# Patient Record
Sex: MALE | Race: WHITE | NOT HISPANIC OR LATINO | URBAN - NONMETROPOLITAN AREA
[De-identification: names, ages, dates, MRNs, and addresses within clinical notes are randomized per-mention and may not be internally consistent; named-entity substitution may affect disease eponyms.]

---

## 2018-12-19 DIAGNOSIS — M25.561 PAIN IN BOTH KNEES, UNSPECIFIED CHRONICITY: Primary | ICD-10-CM

## 2018-12-19 DIAGNOSIS — M25.562 PAIN IN BOTH KNEES, UNSPECIFIED CHRONICITY: Primary | ICD-10-CM

## 2018-12-21 ENCOUNTER — HOSPITAL ENCOUNTER (OUTPATIENT)
Dept: GENERAL RADIOLOGY | Facility: HOSPITAL | Age: 63
Discharge: HOME | End: 2018-12-21
Attending: ORTHOPAEDIC SURGERY | Admitting: ORTHOPAEDIC SURGERY

## 2018-12-21 ENCOUNTER — OFFICE VISIT (OUTPATIENT)
Dept: ORTHOPEDIC SURGERY | Facility: CLINIC | Age: 63
End: 2018-12-21

## 2018-12-21 VITALS
WEIGHT: 245 LBS | BODY MASS INDEX: 33.18 KG/M2 | HEIGHT: 72 IN | SYSTOLIC BLOOD PRESSURE: 121 MMHG | DIASTOLIC BLOOD PRESSURE: 72 MMHG | HEART RATE: 66 BPM

## 2018-12-21 DIAGNOSIS — M23.007 PERIMENISCAL CYST OF LEFT KNEE: ICD-10-CM

## 2018-12-21 DIAGNOSIS — M17.11 PRIMARY OSTEOARTHRITIS OF RIGHT KNEE: ICD-10-CM

## 2018-12-21 DIAGNOSIS — M17.12 PRIMARY OSTEOARTHRITIS OF LEFT KNEE: ICD-10-CM

## 2018-12-21 DIAGNOSIS — G89.29 BILATERAL CHRONIC KNEE PAIN: Primary | ICD-10-CM

## 2018-12-21 DIAGNOSIS — M25.562 BILATERAL CHRONIC KNEE PAIN: Primary | ICD-10-CM

## 2018-12-21 DIAGNOSIS — M25.561 BILATERAL CHRONIC KNEE PAIN: Primary | ICD-10-CM

## 2018-12-21 DIAGNOSIS — M25.562 PAIN IN BOTH KNEES, UNSPECIFIED CHRONICITY: ICD-10-CM

## 2018-12-21 DIAGNOSIS — M25.561 PAIN IN BOTH KNEES, UNSPECIFIED CHRONICITY: ICD-10-CM

## 2018-12-21 PROCEDURE — 73562 X-RAY EXAM OF KNEE 3: CPT | Performed by: RADIOLOGY

## 2018-12-21 PROCEDURE — 20610 DRAIN/INJ JOINT/BURSA W/O US: CPT | Performed by: ORTHOPAEDIC SURGERY

## 2018-12-21 PROCEDURE — 73560 X-RAY EXAM OF KNEE 1 OR 2: CPT | Performed by: RADIOLOGY

## 2018-12-21 PROCEDURE — 73562 X-RAY EXAM OF KNEE 3: CPT

## 2018-12-21 PROCEDURE — 99203 OFFICE O/P NEW LOW 30 MIN: CPT | Performed by: ORTHOPAEDIC SURGERY

## 2018-12-21 PROCEDURE — 73560 X-RAY EXAM OF KNEE 1 OR 2: CPT

## 2018-12-21 RX ORDER — ATENOLOL 50 MG/1
50 TABLET ORAL DAILY
Refills: 4 | COMMUNITY
Start: 2018-10-29

## 2018-12-21 RX ORDER — IBUPROFEN 800 MG/1
TABLET ORAL
Refills: 4 | COMMUNITY
Start: 2018-12-19

## 2018-12-21 RX ORDER — ACETAMINOPHEN 160 MG
2000 TABLET,DISINTEGRATING ORAL DAILY
Refills: 2 | COMMUNITY
Start: 2018-12-13

## 2018-12-21 RX ORDER — ROSUVASTATIN CALCIUM 5 MG/1
5 TABLET, COATED ORAL DAILY
Refills: 1 | COMMUNITY
Start: 2018-11-13

## 2018-12-21 RX ADMIN — METHYLPREDNISOLONE ACETATE 40 MG: 40 INJECTION, SUSPENSION INTRA-ARTICULAR; INTRALESIONAL; INTRAMUSCULAR; SOFT TISSUE at 11:05

## 2018-12-21 RX ADMIN — METHYLPREDNISOLONE ACETATE 40 MG: 40 INJECTION, SUSPENSION INTRA-ARTICULAR; INTRALESIONAL; INTRAMUSCULAR; SOFT TISSUE at 11:00

## 2018-12-21 RX ADMIN — LIDOCAINE HYDROCHLORIDE 2 ML: 20 INJECTION, SOLUTION INFILTRATION; PERINEURAL at 11:00

## 2018-12-21 RX ADMIN — LIDOCAINE HYDROCHLORIDE 2 ML: 20 INJECTION, SOLUTION INFILTRATION; PERINEURAL at 11:05

## 2018-12-21 NOTE — PROGRESS NOTES
New Patient Visit      Patient: Antoni Mckeon  YOB: 1955  Date of Encounter: 12/21/2018        Chief Complaint:   Chief Complaint   Patient presents with   • Right Knee - Pain   • Left Knee - Pain       HPI:   Antoni Mckeon, 63 y.o. male, referred by Melba Munoz APRN presents for evaluation of bilateral knee pain left worse than the right.  He's had treatment in [place] for both knees and around 2000 underwent arthroscopy to both knees and had a follow-up procedure he is not certain what was done.  He complains of both knees painful with activity has difficulty walking even short distances.  He's noticed a large prominence develop on the left knee.  He's had no recent trauma.  He denies giving way or locking of either knee.    Active Problem List:  Patient Active Problem List   Diagnosis   • Bilateral chronic knee pain       Past Medical History:  Past Medical History:   Diagnosis Date   • Diabetes (CMS/HCC)    • HLD (hyperlipidemia)    • HTN (hypertension)    • Mini stroke (CMS/HCC)    • Sleep apnea        Past Surgical History:  Past Surgical History:   Procedure Laterality Date   • HERNIA REPAIR     • KNEE SURGERY         Family History:  No family history on file.    Social History:  Social History     Socioeconomic History   • Marital status:      Spouse name: Not on file   • Number of children: Not on file   • Years of education: Not on file   • Highest education level: Not on file   Social Needs   • Financial resource strain: Not on file   • Food insecurity - worry: Not on file   • Food insecurity - inability: Not on file   • Transportation needs - medical: Not on file   • Transportation needs - non-medical: Not on file   Occupational History   • Not on file   Tobacco Use   • Smoking status: Current Every Day Smoker     Packs/day: 0.50   • Smokeless tobacco: Never Used   Substance and Sexual Activity   • Alcohol use: No     Frequency: Never   • Drug use: No   • Sexual activity:  "Not on file   Other Topics Concern   • Not on file   Social History Narrative   • Not on file     Body mass index is 33.23 kg/m².    Medications:  Current Outpatient Medications   Medication Sig Dispense Refill   • atenolol (TENORMIN) 50 MG tablet Take 50 mg by mouth Daily.  4   • Cholecalciferol (VITAMIN D3) 2000 units capsule Take 2,000 Units by mouth Daily.  2   • ibuprofen (ADVIL,MOTRIN) 800 MG tablet TAKE ONE TABLET BY MOUTH TWO TIMES A DAY AS NEEDED  4   • metFORMIN (GLUCOPHAGE) 1000 MG tablet TAKE ONE TABLET BY MOUTH EVERY MORNING   1 TABLET EVERY EVENING WITH MEALS  4   • rosuvastatin (CRESTOR) 5 MG tablet Take 5 mg by mouth Daily.  1     No current facility-administered medications for this visit.        Allergies:  No Known Allergies    Review of Systems:   Review of Systems   Constitutional: Positive for activity change.   HENT: Positive for hearing loss.    Eyes: Negative.    Respiratory: Negative.    Cardiovascular: Negative.    Gastrointestinal: Negative.    Endocrine: Negative.    Genitourinary: Negative.    Musculoskeletal: Positive for arthralgias, back pain, gait problem and joint swelling.   Skin: Negative.    Allergic/Immunologic: Negative.    Neurological: Negative for numbness.   Hematological: Negative.    Psychiatric/Behavioral: Negative.        Physical Exam:   Physical Exam  GENERAL: 63 y.o. male, alert and oriented X 3 in no acute distress.   Visit Vitals  /72   Pulse 66   Ht 182.9 cm (72\")   Wt 111 kg (245 lb)   BMI 33.23 kg/m²     Musculoskeletal:   Left knee evaluation reveals flexion contracture about 15° with a large cystic mass over the anterolateral aspect.  He has marked medial and lateral joint line tenderness he flexes to 90° with no gross instability significant patellofemoral crepitance.      Right knee evaluation shows flexion to 100° extension lacks 8° he has no instability with varus valgus stressing Lachman or drawer marked medial joint line " tenderness.    Radiology/Labs:     Radiographs of left knee obtained today weightbearing show complete loss of joint space medial compartment with osteophyte formation medially laterally and within the patellofemoral joint.      Radiographs of right knee 75% loss of joint space medial compartment with irregularity medial femoral condyle and osteophyte formation.    Assessment & Plan:   63 y.o. male with advanced osteoarthritis bilateral knees cystic mass left knee.  His best option would be total knee arthroplasty on the left and eventually on the right.  The cyst could likely be treated at the time of his left knee replacement.  Today he has a temporary measure he was treated with aspiration steroid injection removing 5 cc from the left knee and injecting Depo-Medrol, 17 cc was aspirated from the right knee and injecting Depo-Medrol.  He wishes to consider left total knee replacement he'll follow-up in January and if he wishes to schedule we will proceed.      ICD-10-CM ICD-9-CM   1. Bilateral chronic knee pain M25.561 719.46    M25.562 338.29    G89.29    2. Primary osteoarthritis of left knee M17.12 715.16   3. Primary osteoarthritis of right knee M17.11 715.16   4. Perimeniscal cyst of left knee M23.007 717.5     Large Joint Arthrocentesis: R knee  Date/Time: 12/21/2018 11:00 AM  Consent given by: patient  Site marked: site marked  Supporting Documentation  Indications: pain and joint swelling   Procedure Details  Location: knee - R knee  Preparation: Patient was prepped and draped in the usual sterile fashion  Needle size: 18 G  Approach: anterolateral  Medications administered: 40 mg methylPREDNISolone acetate 40 MG/ML; 2 mL lidocaine 2%  Aspirate amount: 17 mL  Aspirate: serous  Patient tolerance: patient tolerated the procedure well with no immediate complications    Large Joint Arthrocentesis: L knee  Date/Time: 12/21/2018 11:05 AM  Consent given by: patient  Site marked: site marked  Supporting  Documentation  Indications: pain and joint swelling   Procedure Details  Location: knee - L knee  Preparation: Patient was prepped and draped in the usual sterile fashion  Needle size: 18 G  Approach: anterolateral  Medications administered: 40 mg methylPREDNISolone acetate 40 MG/ML; 2 mL lidocaine 2%  Aspirate amount: 5 mL  Aspirate: serous  Patient tolerance: patient tolerated the procedure well with no immediate complications                Cc:   Melba Munoz APRN          Scribed for Antoni Canas MD by Glenis Canas RN.9:55 AM 12/21/2018

## 2018-12-22 PROBLEM — M25.561 BILATERAL CHRONIC KNEE PAIN: Status: ACTIVE | Noted: 2018-12-22

## 2018-12-22 PROBLEM — M25.562 BILATERAL CHRONIC KNEE PAIN: Status: ACTIVE | Noted: 2018-12-22

## 2018-12-22 PROBLEM — G89.29 BILATERAL CHRONIC KNEE PAIN: Status: ACTIVE | Noted: 2018-12-22

## 2018-12-22 RX ORDER — LIDOCAINE HYDROCHLORIDE 20 MG/ML
2 INJECTION, SOLUTION INFILTRATION; PERINEURAL
Status: COMPLETED | OUTPATIENT
Start: 2018-12-21 | End: 2018-12-21

## 2018-12-22 RX ORDER — METHYLPREDNISOLONE ACETATE 40 MG/ML
40 INJECTION, SUSPENSION INTRA-ARTICULAR; INTRALESIONAL; INTRAMUSCULAR; SOFT TISSUE
Status: COMPLETED | OUTPATIENT
Start: 2018-12-21 | End: 2018-12-21

## 2019-02-11 ENCOUNTER — OFFICE VISIT (OUTPATIENT)
Dept: ORTHOPEDIC SURGERY | Facility: CLINIC | Age: 64
End: 2019-02-11

## 2019-02-11 VITALS
WEIGHT: 250 LBS | HEIGHT: 72 IN | HEART RATE: 65 BPM | BODY MASS INDEX: 33.86 KG/M2 | SYSTOLIC BLOOD PRESSURE: 131 MMHG | DIASTOLIC BLOOD PRESSURE: 77 MMHG

## 2019-02-11 DIAGNOSIS — G89.29 CHRONIC PAIN OF LEFT KNEE: ICD-10-CM

## 2019-02-11 DIAGNOSIS — M25.562 CHRONIC PAIN OF LEFT KNEE: ICD-10-CM

## 2019-02-11 DIAGNOSIS — M17.12 PRIMARY OSTEOARTHRITIS OF LEFT KNEE: Primary | ICD-10-CM

## 2019-02-11 PROCEDURE — 99214 OFFICE O/P EST MOD 30 MIN: CPT | Performed by: ORTHOPAEDIC SURGERY

## 2019-02-11 PROCEDURE — 99406 BEHAV CHNG SMOKING 3-10 MIN: CPT | Performed by: ORTHOPAEDIC SURGERY

## 2019-02-11 NOTE — PROGRESS NOTES
History and Physical      Patient: Antoni Mckeon  YOB: 1955  Date of Encounter: 02/11/2019      Chief Complaint:   Chief Complaint   Patient presents with   • Left Knee - Pain       HPI:   Antoni Mckeon, 63 y.o. male, seen today in follow-up initially referred by Irvin Munoz MD. He is known to have advanced degenerative arthritis bilateral knees.  He underwent arthroscopy to both knees while living in [place].  He continues to experience significant pain at rest, worsening pain with activity such as walking.  He is limited due to very short distances.  He received steroid injection last visit on 12/21/2018 had several weeks of good response and then pain has slowly returned.  His pain is now back to the level in December when he received steroid injections.  His left knee is currently is his worse knee.  He wishes to consider left total knee replacement.    Active Problem List:  Patient Active Problem List   Diagnosis   • Bilateral chronic knee pain   • Primary osteoarthritis of left knee   • Primary osteoarthritis of right knee   • HLD (hyperlipidemia)   • HTN (hypertension)   • Sleep apnea   • Diabetes (CMS/HCC)       Past Medical History:  Past Medical History:   Diagnosis Date   • Diabetes (CMS/HCC)    • HLD (hyperlipidemia)    • HTN (hypertension)    • Mini stroke (CMS/HCC)    • Sleep apnea        Past Surgical History:  Past Surgical History:   Procedure Laterality Date   • HERNIA REPAIR     • KNEE SURGERY         Family History:  Family History   Problem Relation Age of Onset   • No Known Problems Mother    • No Known Problems Father        Social History:  Social History     Socioeconomic History   • Marital status:      Spouse name: Not on file   • Number of children: Not on file   • Years of education: Not on file   • Highest education level: Not on file   Social Needs   • Financial resource strain: Not on file   • Food insecurity - worry: Not on file   • Food insecurity  - inability: Not on file   • Transportation needs - medical: Not on file   • Transportation needs - non-medical: Not on file   Occupational History   • Not on file   Tobacco Use   • Smoking status: Current Every Day Smoker     Packs/day: 0.50   • Smokeless tobacco: Never Used   Substance and Sexual Activity   • Alcohol use: No     Frequency: Never   • Drug use: No   • Sexual activity: Defer   Other Topics Concern   • Not on file   Social History Narrative   • Not on file     Patient's Body mass index is 33.91 kg/m². BMI is above normal parameters. Recommendations include: educational material.  I advised Antoni of the risks of continuing to use tobacco, and I provided him with tobacco cessation educational materials in the After Visit Summary.     During this visit, I spent 3 minutes counseling the patient regarding tobacco cessation.      Medications:  Current Outpatient Medications   Medication Sig Dispense Refill   • atenolol (TENORMIN) 50 MG tablet Take 50 mg by mouth Daily.  4   • Cholecalciferol (VITAMIN D3) 2000 units capsule Take 2,000 Units by mouth Daily.  2   • ibuprofen (ADVIL,MOTRIN) 800 MG tablet TAKE ONE TABLET BY MOUTH TWO TIMES A DAY AS NEEDED  4   • metFORMIN (GLUCOPHAGE) 1000 MG tablet TAKE ONE TABLET BY MOUTH EVERY MORNING   1 TABLET EVERY EVENING WITH MEALS  4   • rosuvastatin (CRESTOR) 5 MG tablet Take 5 mg by mouth Daily.  1     No current facility-administered medications for this visit.        Allergies:  No Known Allergies    Review of Systems:   Review of Systems   Constitutional: Positive for activity change.   HENT: Positive for hearing loss.    Eyes: Negative.    Respiratory: Negative.    Cardiovascular: Negative.    Gastrointestinal: Negative.    Endocrine: Negative.    Genitourinary: Negative.    Musculoskeletal: Positive for arthralgias, back pain, gait problem and joint swelling.   Skin: Negative.    Allergic/Immunologic: Negative.    Neurological: Negative for numbness.  "  Hematological: Negative.    Psychiatric/Behavioral: The patient is nervous/anxious.        Physical Exam:   Physical Exam   Constitutional: He is oriented to person, place, and time. He appears well-developed. No distress.   HENT:   Head: Normocephalic and atraumatic.   Nose: Nose normal.   Mouth/Throat: Oropharynx is clear and moist.   Eyes: Conjunctivae and EOM are normal. Right eye exhibits no discharge. Left eye exhibits no discharge.   Neck: Normal range of motion. Neck supple.   Cardiovascular: Normal rate, regular rhythm, normal heart sounds and intact distal pulses.   Pulmonary/Chest: Effort normal. No respiratory distress. He has wheezes.   Abdominal: Soft. He exhibits no distension. There is no guarding.   Musculoskeletal: He exhibits edema (Left knee joint effusion).   Left knee evaluation reveals 7° flexion contracture, large fluid-filled mass anterolateral aspect of his knee.  He flexes further to 110°, has moderate varus alignment, no gross deformity, neurovascular grossly intact.  He has very large effusion.   Neurological: He is alert and oriented to person, place, and time.   Skin: Skin is warm and dry. Capillary refill takes less than 2 seconds. He is not diaphoretic.   Psychiatric: He has a normal mood and affect. His behavior is normal. Judgment and thought content normal.   Nursing note and vitals reviewed.    GENERAL: 63 y.o. male, alert and oriented X 3 in no acute distress.   Visit Vitals  /77   Pulse 65   Ht 182.9 cm (72\")   Wt 113 kg (250 lb)   BMI 33.91 kg/m²     Radiology/Labs:    Advanced osteoarthritis left knee with moderate varus alignment complete loss of joint space medial compartment.    Assessment & Plan:   63 y.o. male advanced osteoarthritis bilateral knees his left is currently his worst.  He wishes to schedule left total knee replacement.Today we had long discussion regarding the proposed procedure, risks and benefits. He verbalized understanding.  I think he is a good " candidate. He will need medical clearance in advance along with dental clearance.  We will request both and once obtained he  will be schedule for left total knee replacement at [facility].       ICD-10-CM ICD-9-CM   1. Primary osteoarthritis of left knee M17.12 715.16   2. Chronic pain of left knee M25.562 719.46    G89.29 338.29           Cc:   Irvin Munoz MD          Scribed for Antoni Canas MD by Glenis Canas RN.10:41 AM 02/11/2019

## 2019-02-12 PROBLEM — E78.5 HLD (HYPERLIPIDEMIA): Status: ACTIVE | Noted: 2019-02-12

## 2019-02-12 PROBLEM — I10 HTN (HYPERTENSION): Status: ACTIVE | Noted: 2019-02-12

## 2019-02-12 PROBLEM — M17.12 PRIMARY OSTEOARTHRITIS OF LEFT KNEE: Status: ACTIVE | Noted: 2019-02-12

## 2019-02-12 PROBLEM — G47.30 SLEEP APNEA: Status: ACTIVE | Noted: 2019-02-12

## 2019-02-12 PROBLEM — M17.11 PRIMARY OSTEOARTHRITIS OF RIGHT KNEE: Status: ACTIVE | Noted: 2019-02-12

## 2019-02-12 PROBLEM — E11.9 DIABETES (HCC): Status: ACTIVE | Noted: 2019-02-12

## 2019-03-06 ENCOUNTER — TELEPHONE (OUTPATIENT)
Dept: ORTHOPEDIC SURGERY | Facility: CLINIC | Age: 64
End: 2019-03-06

## 2019-03-06 NOTE — TELEPHONE ENCOUNTER
Patient called 2-18-19 concerning his dental clearance before Left Total Knee Arthroplasty, he stated he would need some dental work and would call and reschedule to come back in when dental work was complete.